# Patient Record
Sex: FEMALE | Race: WHITE | NOT HISPANIC OR LATINO | Employment: UNEMPLOYED | URBAN - METROPOLITAN AREA
[De-identification: names, ages, dates, MRNs, and addresses within clinical notes are randomized per-mention and may not be internally consistent; named-entity substitution may affect disease eponyms.]

---

## 2018-08-14 ENCOUNTER — OFFICE VISIT (OUTPATIENT)
Dept: FAMILY MEDICINE | Facility: CLINIC | Age: 28
End: 2018-08-14
Payer: OTHER GOVERNMENT

## 2018-08-14 VITALS
HEART RATE: 74 BPM | HEIGHT: 65 IN | BODY MASS INDEX: 24.74 KG/M2 | WEIGHT: 148.5 LBS | SYSTOLIC BLOOD PRESSURE: 124 MMHG | OXYGEN SATURATION: 99 % | DIASTOLIC BLOOD PRESSURE: 80 MMHG

## 2018-08-14 DIAGNOSIS — Z13.1 DIABETES MELLITUS SCREENING: ICD-10-CM

## 2018-08-14 DIAGNOSIS — Z01.419 ENCOUNTER FOR WELL WOMAN EXAM WITH ROUTINE GYNECOLOGICAL EXAM: ICD-10-CM

## 2018-08-14 DIAGNOSIS — Z00.00 ANNUAL PHYSICAL EXAM: Primary | ICD-10-CM

## 2018-08-14 DIAGNOSIS — Z13.220 LIPID SCREENING: ICD-10-CM

## 2018-08-14 PROCEDURE — 99395 PREV VISIT EST AGE 18-39: CPT | Mod: ,,, | Performed by: NURSE PRACTITIONER

## 2018-08-14 NOTE — PATIENT INSTRUCTIONS
Prevention Guidelines, Women Ages 18 to 39  Screening tests and vaccines are an important part of managing your health. Health counseling is essential, too. Below are guidelines for these, for women ages 18 to 39. Talk with your healthcare provider to make sure youre up-to-date on what you need.  Screening Who needs it How often   Alcohol misuse All women in this age group At routine exams   Blood pressure All women in this age group Every 2 years if your blood pressure is less than 120/80 mm Hg; yearly if your systolic blood pressure is 120 to 139 mm Hg, or your diastolic blood pressure reading is 80 to 89 mm Hg   Breast cancer All women in this age group should talk with their healthcare providers about the need for clinical breast exams (CBE)1 Clinical breast exam every 3 years1   Cervical cancer Women ages 21 and older Women between ages 21 and 29 should have a Pap test every 3 years; women between ages 30 and 65 are advised to have a Pap test plus an HPV test every 5 years   Chlamydia Sexually active women ages 24 and younger, and women at increased risk for infection Every 3 years if you're at risk or have symptoms   Depression All women in this age group At routine exams   Diabetes mellitus, type 2 Adults with no symptoms who are overweight or obese and have 1 or more other risk factors for diabetes At least every 3 years   Gonorrhea Sexually active women at increased risk for infection At routine exams   Hepatitis C Anyone at increased risk At routine exams   HIV All women At routine exams   Obesity All women in this age group At routine exams   Syphilis Women at increased risk for infection - talk with your healthcare provider At routine exams   Tuberculosis Women at increased risk for infection - talk with your healthcare provider Ask your healthcare provider   Vision All women in this age group At least 1 complete exam in your 20s, and 2 in your 30s   Vaccine2 Who needs it How often   Chickenpox  (varicella) All women in this age group who have no record of this infection or vaccine 2 doses; the second dose should be given 4 to 8 weeks after the first dose   Hepatitis A Women at increased risk for infection - talk with your healthcare provider 2 doses given at least 6 months apart   Hepatitis B Women at increased risk for infection - talk with your healthcare provider 3 doses over 6 months; second dose should be given 1 month after the first dose; the third dose should be given at least 2 months after the second dose and at least 4 months after the first dose   Haemophilus influenzae Type B (HIB) Women at increased risk for infection - talk with your healthcare provider 1 to 3 doses   Human papillomavirus (HPV) All women in this age group up to age 26 3 doses; the second dose should be given 1 to 2 months after the first dose and the third dose given 6 months after the first dose   Influenza (flu) All women in this age group Once a year   Measles, mumps, rubella (MMR) All women in this age group who have no record of these infections or vaccines 1 or 2 doses   Meningococcal Women at increased risk for infection - talk with your healthcare provider 1 or more doses   Pneumococcal conjugate vaccine (PCV13) and pneumococcal polysaccharide vaccine (PPSV23) Women at increased risk for infection - talk with your healthcare provider PCV13: 1 dose ages 19 to 65 (protects against 13 types of pneumococcal bacteria)  PPSV23: 1 to 2 doses through age 64, or 1 dose at 65 or older (protects against 23 types of pneumococcal bacteria)      Tetanus/diphtheria/pertussis (Td/Tdap) booster All women in this age group Td every 10 years, or a one-time dose of Tdap instead of a Td booster after age 18, then Td every 10 years   Counseling Who needs it How often   BRCA gene mutation testing for breast and ovarian cancer susceptibility Women with increased risk for having gene mutation When your risk is known   Breast cancer and  chemoprevention Women at high risk for breast cancer When your risk is known   Diet and exercise Women who are overweight or obese When diagnosed, and then at routine exams   Domestic violence Women at the age in which they are able to have children At routine exams   Sexually transmitted infection prevention Women who are sexually active At routine exams   Skin cancer Prevention of skin cancer in fair-skinned adults through age 24 At routine exams   Use of tobacco and the health effects it can cause All women in this age group Every visit   1According to the ACS, women ages 20 to 39 years should have a clinical breast exam (CBE) as part of their routine health exam every 3 years. Breast self-exams are an option for women starting in their 20s. But the  USPSTF does not recommend CBE.  2Those who are 18 years old and not up-to-date on their childhood vaccines should get all appropriate catch-up vaccines recommended by the CDC.  Date Last Reviewed: 8/27/2015  © 4797-1723 [employer]. [address] [place] [address], [place] [address]. All rights reserved. This information is not intended as a substitute for professional medical care. Always follow your healthcare professional's instructions.

## 2018-08-14 NOTE — PROGRESS NOTES
HPI: oHlly Langston  is a 28 y.o. female who presents for annual physical .  No complaints at this time.     Review of Systems   Constitutional: Negative for activity change, appetite change and fever.   HENT: Negative for congestion, ear discharge, ear pain, sore throat, trouble swallowing and voice change.    Eyes: Negative for photophobia, pain, discharge and visual disturbance.   Respiratory: Negative for cough, chest tightness and shortness of breath.    Cardiovascular: Negative for chest pain and palpitations.   Gastrointestinal: Negative for abdominal pain, nausea and vomiting.   Endocrine: Negative for cold intolerance and heat intolerance.   Genitourinary: Negative for difficulty urinating and dysuria.   Musculoskeletal: Negative for arthralgias and gait problem.   Skin: Negative for rash.   Allergic/Immunologic: Negative for immunocompromised state.   Neurological: Negative for speech difficulty and headaches.   Psychiatric/Behavioral: Negative for confusion, self-injury and suicidal ideas.     Review of patient's allergies indicates:   Allergen Reactions    Sulfa (sulfonamide antibiotics)      History reviewed. No pertinent past medical history.  Past Surgical History:   Procedure Laterality Date    FRACTURE SURGERY       Family History   Problem Relation Age of Onset    Arthritis Father      Social History     Tobacco Use    Smoking status: Never Smoker    Smokeless tobacco: Never Used   Substance Use Topics    Alcohol use: Yes     Comment: drinks wine 3-4x weekly    Drug use: No      Health Maintenance Topics with due status: Not Due       Topic Last Completion Date    Pap Smear 03/02/2017    TETANUS VACCINE 09/01/2017     Immunization History   Administered Date(s) Administered    Influenza 09/01/2017    Tdap 09/01/2017     OBJECTIVE:      Vitals:    08/14/18 1420   BP: 124/80   Pulse: 74     Physical Exam   Constitutional: She is oriented to person, place, and time. She appears well-developed  and well-nourished. She is cooperative. No distress.   HENT:   Head: Normocephalic and atraumatic.   Right Ear: Tympanic membrane and external ear normal.   Left Ear: Tympanic membrane and external ear normal.   Nose: Nose normal.   Mouth/Throat: Oropharynx is clear and moist.   Eyes: Conjunctivae, EOM and lids are normal. Pupils are equal, round, and reactive to light. Lids are everted and swept, no foreign bodies found. Right pupil is round and reactive. Left pupil is round and reactive.   Neck: Trachea normal and normal range of motion. Neck supple.   Cardiovascular: Normal rate, regular rhythm, S1 normal, S2 normal, normal heart sounds and intact distal pulses.   No murmur heard.  Pulmonary/Chest: Effort normal and breath sounds normal. No respiratory distress. She has no wheezes.   Abdominal: Soft. Bowel sounds are normal. There is no rigidity and no guarding.   Musculoskeletal: Normal range of motion.   Lymphadenopathy:     She has no cervical adenopathy.     She has no axillary adenopathy.   Neurological: She is alert and oriented to person, place, and time.   Skin: Skin is warm and dry. Capillary refill takes less than 2 seconds.   Psychiatric: She has a normal mood and affect. Her behavior is normal. Judgment and thought content normal.   Nursing note and vitals reviewed.     Assessment:       1. Annual physical exam    2. Diabetes mellitus screening    3. Lipid screening    4. Encounter for well woman exam with routine gynecological exam        Plan:       Annual physical exam  -     Urinalysis; Future; Expected date: 08/14/2018    Diabetes mellitus screening  -     Basic metabolic panel; Future; Expected date: 08/14/2018    Lipid screening  -     Lipid panel; Future; Expected date: 08/14/2018    Encounter for well woman exam with routine gynecological exam  -     Ambulatory referral to Obstetrics / Gynecology        Patient counseled on age appropriate medical preventative services, age appropriate cancer  screenings, nutrition, healthy diet, consistent exercise regimen and maintaining an active lifestyle.      Counseled on age appropriate vaccines and discussed upcoming health care needs based on age/gender.  Spent time with patient counseling on need for a good patient/doctor relationship moving forward.  Discussed use of common OTC medications and supplements.  Discussed common dietary aids and use of caffeine and the need for good sleep hygiene and stress management.    Follow-up in about 1 year (around 8/14/2019) for annual physical.      8/14/2018 ANTONIA Palmer, FNP-C

## 2018-09-06 LAB
BUN SERPL-MCNC: 12 MG/DL (ref 8–20)
CALCIUM SERPL-MCNC: 9.5 MG/DL (ref 7.7–10.4)
CHLORIDE: 104 MMOL/L (ref 98–110)
CO2 SERPL-SCNC: 27.1 MMOL/L (ref 22.8–31.6)
CREATININE: 0.78 MG/DL (ref 0.6–1.4)
GLUCOSE: 87 MG/DL (ref 70–99)
POTASSIUM SERPL-SCNC: 3.9 MMOL/L (ref 3.5–5)
SODIUM: 139 MMOL/L (ref 134–144)

## 2018-09-07 ENCOUNTER — PATIENT MESSAGE (OUTPATIENT)
Dept: FAMILY MEDICINE | Facility: CLINIC | Age: 28
End: 2018-09-07

## 2018-09-14 ENCOUNTER — CLINICAL SUPPORT (OUTPATIENT)
Dept: PEDIATRICS | Facility: CLINIC | Age: 28
End: 2018-09-14
Payer: OTHER GOVERNMENT

## 2018-09-14 DIAGNOSIS — Z23 NEEDS FLU SHOT: Primary | ICD-10-CM

## 2018-09-14 PROCEDURE — 90686 IIV4 VACC NO PRSV 0.5 ML IM: CPT | Mod: ,,, | Performed by: INTERNAL MEDICINE

## 2018-09-14 PROCEDURE — 90471 IMMUNIZATION ADMIN: CPT | Mod: ,,, | Performed by: INTERNAL MEDICINE

## 2018-09-17 ENCOUNTER — PATIENT MESSAGE (OUTPATIENT)
Dept: FAMILY MEDICINE | Facility: CLINIC | Age: 28
End: 2018-09-17

## 2018-10-08 ENCOUNTER — TELEPHONE (OUTPATIENT)
Dept: FAMILY MEDICINE | Facility: CLINIC | Age: 28
End: 2018-10-08

## 2018-10-08 NOTE — TELEPHONE ENCOUNTER
----- Message from HERVE Silva sent at 10/5/2018 10:19 AM CDT -----  Labs look great.  All normal.

## 2023-07-25 ENCOUNTER — TELEPHONE (OUTPATIENT)
Dept: FAMILY MEDICINE | Facility: CLINIC | Age: 33
End: 2023-07-25

## 2023-07-25 ENCOUNTER — OFFICE VISIT (OUTPATIENT)
Dept: FAMILY MEDICINE | Facility: CLINIC | Age: 33
End: 2023-07-25
Payer: OTHER GOVERNMENT

## 2023-07-25 DIAGNOSIS — J30.1 SEASONAL ALLERGIC RHINITIS DUE TO POLLEN: Primary | ICD-10-CM

## 2023-07-25 DIAGNOSIS — Z12.83 SKIN CANCER SCREENING: ICD-10-CM

## 2023-07-25 DIAGNOSIS — D22.9 NEVUS: ICD-10-CM

## 2023-07-25 PROCEDURE — 99204 OFFICE O/P NEW MOD 45 MIN: CPT | Performed by: NURSE PRACTITIONER

## 2023-07-25 PROCEDURE — 99203 PR OFFICE/OUTPT VISIT, NEW, LEVL III, 30-44 MIN: ICD-10-PCS | Mod: S$PBB,,, | Performed by: NURSE PRACTITIONER

## 2023-07-25 PROCEDURE — 99203 OFFICE O/P NEW LOW 30 MIN: CPT | Mod: S$PBB,,, | Performed by: NURSE PRACTITIONER

## 2023-07-25 RX ORDER — MULTIVIT WITH MINERALS/HERBS
1 TABLET ORAL DAILY
COMMUNITY

## 2023-07-25 RX ORDER — CHOLECALCIFEROL (VITAMIN D3) 25 MCG
1000 TABLET ORAL DAILY
COMMUNITY

## 2023-07-25 RX ORDER — AZELASTINE 1 MG/ML
1 SPRAY, METERED NASAL 2 TIMES DAILY
Qty: 30 ML | Refills: 5 | Status: SHIPPED | OUTPATIENT
Start: 2023-07-25 | End: 2024-02-06 | Stop reason: SDUPTHER

## 2023-07-25 RX ORDER — FLUTICASONE PROPIONATE 50 MCG
1 SPRAY, SUSPENSION (ML) NASAL 2 TIMES DAILY
Qty: 16 G | Refills: 5 | Status: SHIPPED | OUTPATIENT
Start: 2023-07-25 | End: 2024-02-06 | Stop reason: SDUPTHER

## 2023-07-25 RX ORDER — MULTIVITAMIN
1 TABLET ORAL DAILY
COMMUNITY

## 2023-07-25 NOTE — PROGRESS NOTES
Subjective:       Patient ID: Holly Langston is a 33 y.o. female.    Chief Complaint: Sinus Problem (X 2-3 days/Patient in 2018 wants to establish care )    Patient presents today as a new patient to me here to establish care and obtain evaluation for sinus problems and recurrent allergies. She also has multiple nevi that she is wanting to get checked by dermatology. She is requesting a dermatology referral for skin evaluation.  Patient is     Sinus Problem  This is a new problem. The current episode started more than 1 month ago. The problem has been waxing and waning since onset. There has been no fever. The pain is mild. Associated symptoms include congestion and sneezing. Pertinent negatives include no coughing, ear pain, headaches, neck pain, shortness of breath, sore throat or swollen glands. Past treatments include nothing. The treatment provided no relief.   Review of Systems   Constitutional:  Positive for activity change. Negative for appetite change, fever and unexpected weight change.   HENT:  Positive for congestion and sneezing. Negative for ear discharge, ear pain, hearing loss, rhinorrhea, sore throat, trouble swallowing and voice change.    Eyes:  Negative for photophobia, pain, discharge and visual disturbance.   Respiratory:  Negative for cough, chest tightness, shortness of breath and wheezing.    Cardiovascular:  Negative for chest pain and palpitations.   Gastrointestinal:  Negative for abdominal pain, blood in stool, constipation, diarrhea, nausea and vomiting.   Endocrine: Negative for cold intolerance, heat intolerance, polydipsia and polyuria.   Genitourinary:  Negative for difficulty urinating, dysuria, hematuria and menstrual problem.   Musculoskeletal:  Negative for arthralgias, gait problem, joint swelling and neck pain.   Skin:  Negative for rash.        Multiple nevi   Allergic/Immunologic: Negative for immunocompromised state.   Neurological:  Negative for speech difficulty, weakness  "and headaches.   Psychiatric/Behavioral:  Negative for confusion, dysphoric mood, self-injury and suicidal ideas.    History reviewed. No pertinent past medical history.   Past Surgical History:   Procedure Laterality Date    FRACTURE SURGERY         Family History   Problem Relation Age of Onset    Arthritis Father        Social History     Socioeconomic History    Marital status:    Tobacco Use    Smoking status: Never    Smokeless tobacco: Never   Substance and Sexual Activity    Alcohol use: Yes     Comment: drinks wine 3-4x weekly    Drug use: No    Sexual activity: Yes     Birth control/protection: None       Current Outpatient Medications   Medication Sig Dispense Refill    b complex vitamins tablet Take 1 tablet by mouth once daily.      chromium/royal jelly/pollen xt (SERENOL ORAL) Take by mouth.      multivitamin (ONE DAILY MULTIVITAMIN) per tablet Take 1 tablet by mouth once daily.      vitamin D (VITAMIN D3) 1000 units Tab Take 1,000 Units by mouth once daily.      azelastine (ASTELIN) 137 mcg (0.1 %) nasal spray 1 spray (137 mcg total) by Nasal route 2 (two) times daily. 30 mL 5    fluticasone propionate (FLONASE) 50 mcg/actuation nasal spray 1 spray (50 mcg total) by Each Nostril route 2 (two) times daily. 16 g 5    prenatal vit calc,iron,folic (PRENATAL VITAMIN ORAL) Take 1 tablet by mouth once daily.       No current facility-administered medications for this visit.       Review of patient's allergies indicates:   Allergen Reactions    Sulfa (sulfonamide antibiotics)      Objective:    HPI     Sinus Problem     Additional comments: X 2-3 days  Patient in 2018 wants to establish care           Last edited by Sofía Díaz MA on 7/25/2023  8:46 AM.      Pulse (P) 64, height (P) 5' 6" (1.676 m), weight (P) 66.2 kg (145 lb 14.4 oz), SpO2 (P) 98 %. Body mass index is 23.55 kg/m² (pended).   Physical Exam  Vitals and nursing note reviewed.   Constitutional:       General: She is not in acute " distress.     Appearance: Normal appearance. She is well-developed.   HENT:      Head: Normocephalic and atraumatic.      Right Ear: External ear normal.      Left Ear: External ear normal.      Nose: Nose normal.      Mouth/Throat:      Mouth: Mucous membranes are moist.   Eyes:      General: Lids are normal. Lids are everted, no foreign bodies appreciated.      Conjunctiva/sclera: Conjunctivae normal.      Pupils: Pupils are equal, round, and reactive to light.      Right eye: Pupil is round and reactive.      Left eye: Pupil is round and reactive.   Neck:      Trachea: Trachea normal.   Cardiovascular:      Rate and Rhythm: Normal rate and regular rhythm.      Pulses: Normal pulses.      Heart sounds: Normal heart sounds, S1 normal and S2 normal.   Pulmonary:      Effort: Pulmonary effort is normal.      Breath sounds: Normal breath sounds.   Abdominal:      General: Abdomen is flat. Bowel sounds are normal.      Palpations: Abdomen is soft. Abdomen is not rigid.      Tenderness: There is no guarding.   Musculoskeletal:         General: Normal range of motion.      Cervical back: Normal range of motion and neck supple. No muscular tenderness.   Lymphadenopathy:      Cervical: No cervical adenopathy.   Skin:     General: Skin is warm and dry.      Capillary Refill: Capillary refill takes less than 2 seconds.   Neurological:      General: No focal deficit present.      Mental Status: She is alert and oriented to person, place, and time.   Psychiatric:         Mood and Affect: Mood normal.         Behavior: Behavior normal. Behavior is cooperative.         Thought Content: Thought content normal.         Judgment: Judgment normal.           Assessment:       1. Seasonal allergic rhinitis due to pollen    2. Skin cancer screening    3. Nevus    4. BMI 23.0-23.9, adult        Plan:       Holly was seen today for sinus problem.    Diagnoses and all orders for this visit:    Seasonal allergic rhinitis due to pollen  -      fluticasone propionate (FLONASE) 50 mcg/actuation nasal spray; 1 spray (50 mcg total) by Each Nostril route 2 (two) times daily.  -     azelastine (ASTELIN) 137 mcg (0.1 %) nasal spray; 1 spray (137 mcg total) by Nasal route 2 (two) times daily.    Skin cancer screening  -     Ambulatory referral/consult to Dermatology; Future    Nevus  -     Ambulatory referral/consult to Dermatology; Future    BMI 23.0-23.9, adult  Healthy diet and exercise advised.    Follow up in 6 months for annual wellness.

## 2023-07-25 NOTE — TELEPHONE ENCOUNTER
Spoke to pt and asked her to reach out to [facility] to make sure Julissa is listed as her PCP. Pt stated she would call  and call us back once it is done.

## 2023-07-25 NOTE — TELEPHONE ENCOUNTER
Pt called back and stated they have changed her pcp to Julissa and they told her it could take at least 3 business days for it to reflect.

## 2023-07-25 NOTE — TELEPHONE ENCOUNTER
Pt called back and stated [facility] has changed her pcp to Julissa and they told her it could take at least 3 business days for it to reflect.

## 2023-07-27 ENCOUNTER — HOSPITAL ENCOUNTER (EMERGENCY)
Facility: HOSPITAL | Age: 33
Discharge: HOME | End: 2023-07-27
Attending: EMERGENCY MEDICINE
Payer: OTHER GOVERNMENT

## 2023-07-27 VITALS
WEIGHT: 145 LBS | RESPIRATION RATE: 16 BRPM | BODY MASS INDEX: 23.3 KG/M2 | DIASTOLIC BLOOD PRESSURE: 90 MMHG | OXYGEN SATURATION: 100 % | HEIGHT: 66 IN | HEART RATE: 90 BPM | TEMPERATURE: 98 F | SYSTOLIC BLOOD PRESSURE: 128 MMHG

## 2023-07-27 DIAGNOSIS — N93.8 DYSFUNCTIONAL UTERINE BLEEDING: Primary | ICD-10-CM

## 2023-07-27 DIAGNOSIS — Z97.5 IUD (INTRAUTERINE DEVICE) IN PLACE: ICD-10-CM

## 2023-07-27 DIAGNOSIS — N93.9 VAGINAL BLEEDING: ICD-10-CM

## 2023-07-27 LAB
ALBUMIN SERPL BCP-MCNC: 4.6 G/DL (ref 3.5–5.2)
ALP SERPL-CCNC: 33 U/L (ref 55–135)
ALT SERPL W/O P-5'-P-CCNC: 22 U/L (ref 10–44)
ANION GAP SERPL CALC-SCNC: 7 MMOL/L (ref 8–16)
AST SERPL-CCNC: 21 U/L (ref 10–40)
B-HCG UR QL: NEGATIVE
BACTERIA GENITAL QL WET PREP: ABNORMAL
BASOPHILS # BLD AUTO: 0.01 K/UL (ref 0–0.2)
BASOPHILS NFR BLD: 0.2 % (ref 0–1.9)
BILIRUB SERPL-MCNC: 0.9 MG/DL (ref 0.1–1)
BUN SERPL-MCNC: 15 MG/DL (ref 6–20)
CALCIUM SERPL-MCNC: 9.6 MG/DL (ref 8.7–10.5)
CHLORIDE SERPL-SCNC: 105 MMOL/L (ref 95–110)
CLUE CELLS VAG QL WET PREP: ABNORMAL
CO2 SERPL-SCNC: 25 MMOL/L (ref 23–29)
CREAT SERPL-MCNC: 0.8 MG/DL (ref 0.5–1.4)
CTP QC/QA: YES
DIFFERENTIAL METHOD: NORMAL
EOSINOPHIL # BLD AUTO: 0.1 K/UL (ref 0–0.5)
EOSINOPHIL NFR BLD: 2.4 % (ref 0–8)
ERYTHROCYTE [DISTWIDTH] IN BLOOD BY AUTOMATED COUNT: 12.7 % (ref 11.5–14.5)
EST. GFR  (NO RACE VARIABLE): >60 ML/MIN/1.73 M^2
FILAMENT FUNGI VAG WET PREP-#/AREA: ABNORMAL
GLUCOSE SERPL-MCNC: 97 MG/DL (ref 70–110)
HCT VFR BLD AUTO: 43.5 % (ref 37–48.5)
HGB BLD-MCNC: 14.7 G/DL (ref 12–16)
IMM GRANULOCYTES # BLD AUTO: 0.01 K/UL (ref 0–0.04)
IMM GRANULOCYTES NFR BLD AUTO: 0.2 % (ref 0–0.5)
LYMPHOCYTES # BLD AUTO: 1.5 K/UL (ref 1–4.8)
LYMPHOCYTES NFR BLD: 29 % (ref 18–48)
MCH RBC QN AUTO: 30.9 PG (ref 27–31)
MCHC RBC AUTO-ENTMCNC: 33.8 G/DL (ref 32–36)
MCV RBC AUTO: 91 FL (ref 82–98)
MONOCYTES # BLD AUTO: 0.4 K/UL (ref 0.3–1)
MONOCYTES NFR BLD: 7.6 % (ref 4–15)
NEUTROPHILS # BLD AUTO: 3.1 K/UL (ref 1.8–7.7)
NEUTROPHILS NFR BLD: 60.6 % (ref 38–73)
NRBC BLD-RTO: 0 /100 WBC
PLATELET # BLD AUTO: 183 K/UL (ref 150–450)
PMV BLD AUTO: 9.6 FL (ref 9.2–12.9)
POTASSIUM SERPL-SCNC: 4.4 MMOL/L (ref 3.5–5.1)
PROT SERPL-MCNC: 7.6 G/DL (ref 6–8.4)
RBC # BLD AUTO: 4.76 M/UL (ref 4–5.4)
SODIUM SERPL-SCNC: 137 MMOL/L (ref 136–145)
SPECIMEN SOURCE: ABNORMAL
T VAGINALIS GENITAL QL WET PREP: ABNORMAL
WBC # BLD AUTO: 5.03 K/UL (ref 3.9–12.7)
WBC #/AREA VAG WET PREP: ABNORMAL
YEAST GENITAL QL WET PREP: ABNORMAL

## 2023-07-27 PROCEDURE — 80053 COMPREHEN METABOLIC PANEL: CPT | Performed by: NURSE PRACTITIONER

## 2023-07-27 PROCEDURE — 87591 N.GONORRHOEAE DNA AMP PROB: CPT | Performed by: NURSE PRACTITIONER

## 2023-07-27 PROCEDURE — 81025 URINE PREGNANCY TEST: CPT | Performed by: NURSE PRACTITIONER

## 2023-07-27 PROCEDURE — 85025 COMPLETE CBC W/AUTO DIFF WBC: CPT | Performed by: NURSE PRACTITIONER

## 2023-07-27 PROCEDURE — 99284 EMERGENCY DEPT VISIT MOD MDM: CPT | Mod: 25

## 2023-07-27 PROCEDURE — 87210 SMEAR WET MOUNT SALINE/INK: CPT | Performed by: NURSE PRACTITIONER

## 2023-07-27 NOTE — FIRST PROVIDER EVALUATION
"Medical screening examination initiated.  I have conducted a focused provider triage encounter, findings are as follows:    Brief history of present illness:  Irregular vaginal bleeding x a few days with mild cramping. Has an IUD and concerned on why she is having abnormal bleeding. Not soaking pads or having lightheadedness, syncope, palpitations.     Vitals:    07/27/23 1032   BP: (!) 128/90   BP Location: Left arm   Patient Position: Sitting   Pulse: 90   Resp: 16   Temp: 98 °F (36.7 °C)   TempSrc: Oral   SpO2: 100%   Weight: 65.8 kg (145 lb)   Height: 5' 6" (1.676 m)       Pertinent physical exam:  No distress    Brief workup plan:  Basic labs, UPT    Preliminary workup initiated; this workup will be continued and followed by the physician or advanced practice provider that is assigned to the patient when roomed.  "

## 2023-07-27 NOTE — ED PROVIDER NOTES
Encounter Date: 7/27/2023       History     Chief Complaint   Patient presents with    Vaginal Bleeding     X 1 DAY    Abdominal Cramping     Holly Langston is a 33 year old female with no pmh presenting to the ED with c/o vaginal bleeding. She reports having a copper IUD that has been in place for the past two years. She had a normal menstrual period two weeks ago and began having menstrual cramps and vaginal bleeding yesterday. She states it is as heavy as a normal period. She has had no lightheadedness/dizziness, palpitations, SOB.     Review of patient's allergies indicates:   Allergen Reactions    Sulfa (sulfonamide antibiotics)      No past medical history on file.  Past Surgical History:   Procedure Laterality Date    FRACTURE SURGERY       Family History   Problem Relation Age of Onset    Arthritis Father      Social History     Tobacco Use    Smoking status: Never    Smokeless tobacco: Never   Substance Use Topics    Alcohol use: Yes     Comment: drinks wine 3-4x weekly    Drug use: No     Review of Systems   Constitutional:  Negative for fever.   HENT:  Negative for sore throat.    Respiratory:  Negative for shortness of breath.    Cardiovascular:  Negative for chest pain.   Gastrointestinal:  Negative for nausea and vomiting.   Genitourinary:  Positive for menstrual problem and vaginal bleeding. Negative for dysuria and vaginal discharge.   Musculoskeletal:  Negative for back pain.   Skin:  Negative for rash.   Neurological:  Negative for weakness.   Hematological:  Does not bruise/bleed easily.     Physical Exam     Initial Vitals [07/27/23 1032]   BP Pulse Resp Temp SpO2   (!) 128/90 90 16 98 °F (36.7 °C) 100 %      MAP       --         Physical Exam    Nursing note and vitals reviewed.  Constitutional: She appears well-developed and well-nourished. She is not diaphoretic. No distress.   HENT:   Head: Normocephalic and atraumatic.   Mouth/Throat: Oropharynx is clear and moist.   Eyes: Conjunctivae are  normal.   Neck: Neck supple.   Cardiovascular:  Normal rate, regular rhythm, normal heart sounds and intact distal pulses.     Exam reveals no gallop and no friction rub.       No murmur heard.  Pulmonary/Chest: Breath sounds normal. No respiratory distress. She has no wheezes. She has no rhonchi. She has no rales.   Abdominal: Abdomen is soft. She exhibits no distension. There is abdominal tenderness (mild suprapubic\).   Genitourinary: Cervix exhibits no motion tenderness and no discharge. Right adnexum displays no tenderness. Left adnexum displays no tenderness.    Genitourinary Comments: Unable to visualize IUD strings  Moderate amount of dark red menstrual blood in vaginal vault     Musculoskeletal:         General: Normal range of motion.      Cervical back: Neck supple.     Neurological: She is alert and oriented to person, place, and time.   Skin: No rash noted. No erythema.   Psychiatric: Her speech is normal.       ED Course   Procedures  Labs Reviewed   VAGINAL SCREEN - Abnormal; Notable for the following components:       Result Value    WBC - Vaginal Screen Rare (*)     Bacteria - Vaginal Screen Rare (*)     All other components within normal limits   COMPREHENSIVE METABOLIC PANEL - Abnormal; Notable for the following components:    Alkaline Phosphatase 33 (*)     Anion Gap 7 (*)     All other components within normal limits   C. TRACHOMATIS/N. GONORRHOEAE BY AMP DNA   CBC W/ AUTO DIFFERENTIAL   POCT URINE PREGNANCY          Imaging Results              US Pelvis Complete Non OB (Final result)  Result time 07/27/23 14:10:53   Procedure changed from US Transvaginal Non OB     Final result by Francisco Gallegos MD (07/27/23 14:10:53)                   Narrative:    Reason: irregular bleeding IUD for 2 years, LMP 7/12/2023, 33-year-old female    COMPARISON: None    FINDINGS:  Transabdominal sonography shows uterus measuring 10.1 x 4.5 x 5.9 cm. Echogenic focus along the endometrium represents an intrauterine  device. Endometrial thickness of 6 mm is normal.    Right ovary measures 30 x 24 x 30 mm, containing a 23 mm physiologic follicle. Left ovary measures 24 x 21 x 18 mm. Bilateral ovaries contain normal vascular flow in color and spectral Doppler imaging. No other adnexal mass or free pelvic fluid.    IMPRESSION:    1. IUD along the endometrium.  2. Otherwise normal pelvic ultrasound.    Electronically signed by:  Francisco Gallegos MD  7/27/2023 2:10 PM CDT Workstation: [id number]                                     Medications - No data to display        APC / Resident Notes:   This is an urgent evaluation of a 33 year old female presenting to the ED for vaginal bleeding. She has normal vital signs while in the ED. Labs ordered and reviewed with results as follows:   07/27/23 11:09  WBC: 5.03  RBC: 4.76  Hemoglobin: 14.7  Hematocrit: 43.5  07/27/23 11:09  Sodium: 137  Potassium: 4.4  Chloride: 105  CO2: 25  Anion Gap: 7 (L)  BUN: 15  Creatinine: 0.8  eGFR: >60.0  Glucose: 97  Calcium: 9.6  Alkaline Phosphatase: 33 (L)  PROTEIN TOTAL: 7.6  Albumin: 4.6  BILIRUBIN TOTAL: 0.9  AST: 21  ALT: 22    07/27/23 11:24  Preg Test, Ur: Negative      07/27/23 13:07  WBC - Vaginal Screen: Rare !  Bacteria - Vaginal Screen: Rare !  Budding Yeast: None  Clue Cells, Wet Prep: None  Fungal Hyphae: None  Trichomonas: None    Ultrasound ordered and reviewed with results as follows:   IUD along the endometrium.  2. Otherwise normal pelvic ultrasound.    I discussed the case with Dr. Otero, GYN, who states ultrasound appears normal and patient appears to be stable for discharge. Bleeding could be due to stress from recent move. She has an appointment with Dr. Ocasio on 8-2-23 and will keep this scheduled appointment. Strict ED return precautions discussed and patient verbalized understanding. Based on my clinical evaluation, I do not appreciate any immediate, emergent, or life threatening condition or etiology that warrants additional workup  today and feel that the patient can be discharged with close follow up care.                      Clinical Impression:   Final diagnoses:  [N93.9] Vaginal bleeding  [N93.8] Dysfunctional uterine bleeding (Primary)  [Z97.5] IUD (intrauterine device) in place        ED Disposition Condition    Discharge Stable          ED Prescriptions    None       Follow-up Information       Follow up With Specialties Details Why Contact Info Additional Information    [facility] - Emergency Dept Emergency Medicine  As needed, If symptoms worsen [address] [facility] [id number]  [phone] 1st floor    Shashank Ocasio MD Obstetrics and Gynecology   [address]  [facility] [address]  [phone]                Pamela Shultz NP  07/27/23 1710       Randall Phillips MD  07/27/23 2016

## 2023-07-28 VITALS — SYSTOLIC BLOOD PRESSURE: 122 MMHG | DIASTOLIC BLOOD PRESSURE: 84 MMHG

## 2023-07-29 LAB
CHLAMYDIA, AMPLIFIED DNA: NEGATIVE
N GONORRHOEAE, AMPLIFIED DNA: NEGATIVE

## 2023-07-31 ENCOUNTER — TELEPHONE (OUTPATIENT)
Dept: OBSTETRICS AND GYNECOLOGY | Facility: CLINIC | Age: 33
End: 2023-07-31
Payer: OTHER GOVERNMENT

## 2023-07-31 NOTE — PROGRESS NOTES
HISTORY OF THE PRESENT ILLNESS    Holly is a 33 y.o., using Copper IUD for contraception, here for evaluation of abnormal bleeding.  Her LMP was normal on 12 JUL, ended as it usually does, and then she started bleeding again on 26 JUL.  Went to ER 27 JUL.  US showed IUD is where it belongs (strings not seen on exam).  Hgb/Hct were normal.    Today is day 6 of the bleeding.  It's been intermittently heavy.  Today is the first day that it's let up.  Was having a lot of sex in the week before the bleeding.  Her 's job was on a ship where he'd be gone for a couple months at a time.  [place] last [place].  The move is for his work and for them to be together more as a family.    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  Included PAP, IUD check, etc.  Normal for her: 5d monthly menses, usually 2 days heavy, can tell when she's ovulating, very tuned in to her body.  She can't feel IUD strings herself but has never checked much.  -------------------------------------------------------------------------------------------------------------    SOCIAL HISTORY  Lives with:  and two daughters  Domestic Violence: no  Occupation: homemaker  Education Level: Undergraduate Degree, has teaching license  Smoker: non-smoker  Alcohol: yes (weekends)  Drugs: denies  Relationship:     PAST MEDICAL HISTORY  none    PAST SURGICAL HISTORY  none    MEDICATIONS  Dietary supplements  Flonase  Claritin  MVI + D  Probiotic  B12 / folate  Serenol - started 14 JUN for PMS symptoms    ALLERGIES  Sulfa (patient does not have an allergy, but her mother and MGM do)    OBSTETRIC HISTORY  Number of vaginal deliveries: 2, had GHTN with the second  Ages of children: 5 and 2, both girls    GYNECOLOGIC HISTORY  PAP'S: no h/o abnormals, last one in JUN  STI'S: no past history  GENITAL HSV: no  DYSMENORRHEA: No  DYSPAREUNIA: No    FAMILY HISTORY  BLEEDING DISORDERS: none  CLOTTING DISORDERS:  "none  BREAST/UTERINE/OVARIAN CANCER: none    --------------------------------------------------------------------------------------------------------------    PHYSICAL EXAM  Vitals:    23 1457   BP: 124/76   Resp: 17       GEN = alert/oriented, nad, anxious but pleasant  HEENT = sclera anicteric, EOM grossly normal   = nefg, no lesions, vaginal mucosa normal without lesions, normal discharge - small dark blood noted, CVX normal without lesions, IUD strings readily seen    CT/NG (2023) = neg  WET PREP (2023) = neg    Lab Date: 2023   WBC 5   HGB 15   HCT 44        PELVIC US (2023) = uterus [id number], EMS 6mm, ROV 2h5n4td, LOV 6f7v1cg, IUD "along the endometrium"      ASSESSMENT AND PLAN:  32yo  with Paragard IUD and isolated episode of unscheduled bleeding.  Discussed a few possible explanations to include stress of recent move and life changes, increased frequency of sex may have mechanically caused some bleeding, new dietary supplement started .  Reassured patient re normal exam today, normal ultrasound last week, and normal labs done in ER.    -monitor next few cycles  -RTC if bleeding pattern doesn't return to normal    WEEKS, MD    "

## 2023-07-31 NOTE — TELEPHONE ENCOUNTER
----- Message from Genny Herrera sent at 7/31/2023  7:11 AM CDT -----  Regarding: Questions and concerns  Contact: [phone]  Patient Holly is calling. Patient was seen in the ER Thursday and is still bleeding and would like to know if she can be seen today. Please call patient at [phone]

## 2023-08-01 ENCOUNTER — OFFICE VISIT (OUTPATIENT)
Dept: OBSTETRICS AND GYNECOLOGY | Facility: CLINIC | Age: 33
End: 2023-08-01
Payer: OTHER GOVERNMENT

## 2023-08-01 VITALS
BODY MASS INDEX: 23.2 KG/M2 | HEIGHT: 66 IN | RESPIRATION RATE: 17 BRPM | DIASTOLIC BLOOD PRESSURE: 76 MMHG | SYSTOLIC BLOOD PRESSURE: 124 MMHG | WEIGHT: 144.38 LBS

## 2023-08-01 DIAGNOSIS — N92.1 IRREGULAR INTERMENSTRUAL BLEEDING: Primary | ICD-10-CM

## 2023-08-01 PROCEDURE — 99213 OFFICE O/P EST LOW 20 MIN: CPT | Mod: PBBFAC,PO | Performed by: GENERAL PRACTICE

## 2023-08-01 PROCEDURE — 99204 PR OFFICE/OUTPT VISIT, NEW, LEVL IV, 45-59 MIN: ICD-10-PCS | Mod: S$PBB,,, | Performed by: GENERAL PRACTICE

## 2023-08-01 PROCEDURE — 99204 OFFICE O/P NEW MOD 45 MIN: CPT | Mod: S$PBB,,, | Performed by: GENERAL PRACTICE

## 2023-08-01 PROCEDURE — 99999 PR PBB SHADOW E&M-EST. PATIENT-LVL III: CPT | Mod: PBBFAC,,, | Performed by: GENERAL PRACTICE

## 2023-08-01 PROCEDURE — 99999 PR PBB SHADOW E&M-EST. PATIENT-LVL III: ICD-10-PCS | Mod: PBBFAC,,, | Performed by: GENERAL PRACTICE

## 2023-09-21 ENCOUNTER — TELEPHONE (OUTPATIENT)
Dept: FAMILY MEDICINE | Facility: CLINIC | Age: 33
End: 2023-09-21
Payer: OTHER GOVERNMENT

## 2023-09-21 NOTE — TELEPHONE ENCOUNTER
Derm referral: spoke with patient. [facility] too long; travel not option. States she'll call insurance and contact in network providers.

## 2023-12-12 ENCOUNTER — PATIENT MESSAGE (OUTPATIENT)
Dept: FAMILY MEDICINE | Facility: CLINIC | Age: 33
End: 2023-12-12

## 2024-01-24 ENCOUNTER — PATIENT MESSAGE (OUTPATIENT)
Dept: FAMILY MEDICINE | Facility: CLINIC | Age: 34
End: 2024-01-24
Payer: OTHER GOVERNMENT

## 2024-02-06 ENCOUNTER — OFFICE VISIT (OUTPATIENT)
Dept: FAMILY MEDICINE | Facility: CLINIC | Age: 34
End: 2024-02-06
Payer: OTHER GOVERNMENT

## 2024-02-06 VITALS
HEART RATE: 62 BPM | TEMPERATURE: 99 F | BODY MASS INDEX: 23.14 KG/M2 | WEIGHT: 144 LBS | SYSTOLIC BLOOD PRESSURE: 110 MMHG | OXYGEN SATURATION: 99 % | HEIGHT: 66 IN | DIASTOLIC BLOOD PRESSURE: 64 MMHG

## 2024-02-06 DIAGNOSIS — Z00.00 ANNUAL PHYSICAL EXAM: Primary | ICD-10-CM

## 2024-02-06 DIAGNOSIS — Z11.59 NEED FOR HEPATITIS C SCREENING TEST: ICD-10-CM

## 2024-02-06 DIAGNOSIS — Z11.4 ENCOUNTER FOR SCREENING FOR HIV: ICD-10-CM

## 2024-02-06 DIAGNOSIS — J30.1 SEASONAL ALLERGIC RHINITIS DUE TO POLLEN: ICD-10-CM

## 2024-02-06 DIAGNOSIS — Z13.29 THYROID DISORDER SCREEN: ICD-10-CM

## 2024-02-06 DIAGNOSIS — Z01.00 ROUTINE EYE EXAM: ICD-10-CM

## 2024-02-06 DIAGNOSIS — Z13.220 LIPID SCREENING: ICD-10-CM

## 2024-02-06 PROCEDURE — 99395 PREV VISIT EST AGE 18-39: CPT | Mod: S$PBB,,, | Performed by: NURSE PRACTITIONER

## 2024-02-06 PROCEDURE — 99215 OFFICE O/P EST HI 40 MIN: CPT | Mod: PBBFAC,PN | Performed by: NURSE PRACTITIONER

## 2024-02-06 PROCEDURE — 99999 PR PBB SHADOW E&M-EST. PATIENT-LVL V: CPT | Mod: PBBFAC,,, | Performed by: NURSE PRACTITIONER

## 2024-02-06 RX ORDER — LACTOBACILL 46/B.ANIMAL/INULIN 10B-100 MG
CAPSULE ORAL
COMMUNITY
Start: 2021-04-01

## 2024-02-06 RX ORDER — FLUTICASONE PROPIONATE 50 MCG
1 SPRAY, SUSPENSION (ML) NASAL 2 TIMES DAILY
Qty: 16 G | Refills: 5 | Status: SHIPPED | OUTPATIENT
Start: 2024-02-06

## 2024-02-06 RX ORDER — AZELASTINE 1 MG/ML
1 SPRAY, METERED NASAL 2 TIMES DAILY
Qty: 30 ML | Refills: 5 | Status: SHIPPED | OUTPATIENT
Start: 2024-02-06 | End: 2025-02-05

## 2024-02-06 NOTE — PROGRESS NOTES
HPI: Holly Langston  is a 33 y.o. female who presents for annual physical .  No complaints at this time.     Review of Systems   Constitutional:  Negative for activity change, appetite change, fever and unexpected weight change.   HENT:  Negative for congestion, ear discharge, ear pain, hearing loss, rhinorrhea, sore throat, trouble swallowing and voice change.    Eyes:  Negative for photophobia, pain, discharge and visual disturbance.   Respiratory:  Negative for cough, chest tightness, shortness of breath and wheezing.    Cardiovascular:  Negative for chest pain and palpitations.   Gastrointestinal:  Negative for abdominal pain, blood in stool, constipation, diarrhea, nausea and vomiting.   Endocrine: Negative for cold intolerance, heat intolerance, polydipsia and polyuria.   Genitourinary:  Negative for difficulty urinating, dysuria, hematuria and menstrual problem.   Musculoskeletal:  Negative for arthralgias, gait problem, joint swelling and neck pain.   Skin:  Negative for rash.   Allergic/Immunologic: Negative for immunocompromised state.   Neurological:  Negative for speech difficulty, weakness and headaches.   Psychiatric/Behavioral:  Negative for confusion, dysphoric mood, self-injury and suicidal ideas.      Review of patient's allergies indicates:   Allergen Reactions    Sulfa (sulfonamide antibiotics)      History reviewed. No pertinent past medical history.  Past Surgical History:   Procedure Laterality Date    FRACTURE SURGERY       Family History   Problem Relation Age of Onset    Arthritis Father      Social History     Tobacco Use    Smoking status: Never    Smokeless tobacco: Never   Substance Use Topics    Alcohol use: Yes     Comment: drinks wine 3-4x weekly    Drug use: No      Health Maintenance Topics with due status: Not Due       Topic Last Completion Date    TETANUS VACCINE 09/01/2017     Immunization History   Administered Date(s) Administered    Hepatitis A, Adult 11/06/2012, 11/19/2013     Influenza 09/01/2017    Influenza - Intranasal - Trivalent 11/06/2012, 10/08/2013    Influenza - Quadrivalent - PF *Preferred* (6 months and older) 09/14/2018    MMR 11/21/2013    PPD Test 11/06/2012, 11/19/2013    Tdap 11/06/2012, 09/01/2017     OBJECTIVE:      Vitals:    02/06/24 1631   BP: 110/64   Pulse: 62   Temp: 98.5 °F (36.9 °C)     Physical Exam  Vitals and nursing note reviewed.   Constitutional:       General: She is not in acute distress.     Appearance: Normal appearance. She is well-developed. She is not ill-appearing.   HENT:      Head: Normocephalic and atraumatic.      Right Ear: Tympanic membrane, ear canal and external ear normal.      Left Ear: Tympanic membrane, ear canal and external ear normal.      Nose: Nose normal. No congestion.      Mouth/Throat:      Mouth: Mucous membranes are moist.      Pharynx: Oropharynx is clear. No oropharyngeal exudate.   Eyes:      General: Lids are normal. Lids are everted, no foreign bodies appreciated.      Conjunctiva/sclera: Conjunctivae normal.      Pupils: Pupils are equal, round, and reactive to light.      Right eye: Pupil is round and reactive.      Left eye: Pupil is round and reactive.   Neck:      Trachea: Trachea normal.   Cardiovascular:      Rate and Rhythm: Normal rate and regular rhythm.      Pulses: Normal pulses.      Heart sounds: Normal heart sounds, S1 normal and S2 normal.   Pulmonary:      Effort: Pulmonary effort is normal.      Breath sounds: Normal breath sounds.   Abdominal:      General: Abdomen is flat. Bowel sounds are normal.      Palpations: Abdomen is soft. Abdomen is not rigid.      Tenderness: There is no guarding.   Musculoskeletal:         General: Normal range of motion.      Cervical back: Normal range of motion and neck supple. No muscular tenderness.   Lymphadenopathy:      Cervical: No cervical adenopathy.   Skin:     General: Skin is warm and dry.      Capillary Refill: Capillary refill takes less than 2 seconds.    Neurological:      General: No focal deficit present.      Mental Status: She is alert and oriented to person, place, and time.   Psychiatric:         Mood and Affect: Mood normal.         Behavior: Behavior normal. Behavior is cooperative.         Thought Content: Thought content normal.         Judgment: Judgment normal.        Assessment:       1. Annual physical exam    2. Seasonal allergic rhinitis due to pollen    3. Routine eye exam    4. Need for hepatitis C screening test    5. Encounter for screening for HIV    6. Thyroid disorder screen    7. Lipid screening    8. BMI 23.0-23.9, adult        Plan:       Annual physical exam  Completed    Seasonal allergic rhinitis due to pollen  -     azelastine (ASTELIN) 137 mcg (0.1 %) nasal spray; 1 spray (137 mcg total) by Nasal route 2 (two) times daily.  Dispense: 30 mL; Refill: 5  -     fluticasone propionate (FLONASE) 50 mcg/actuation nasal spray; 1 spray (50 mcg total) by Each Nostril route 2 (two) times daily.  Dispense: 16 g; Refill: 5    Routine eye exam  -     Ambulatory referral/consult to Ophthalmology; Future; Expected date: 02/13/2024    Need for hepatitis C screening test  -     Hepatitis C Antibody; Future; Expected date: 02/06/2024    Encounter for screening for HIV  -     HIV 1/2 Ag/Ab (4th Gen); Future; Expected date: 02/06/2024    Thyroid disorder screen  -     TSH; Future; Expected date: 02/06/2024    Lipid screening  -     Lipid Panel; Future; Expected date: 02/06/2024    BMI 23.0-23.9, adult  Healthy diet and exercise encouraged.      Patient counseled on age appropriate medical preventative services, age appropriate cancer screenings, nutrition, healthy diet, consistent exercise regimen and maintaining an active lifestyle.      Counseled on age appropriate vaccines and discussed upcoming health care needs based on age/gender.  Spent time with patient counseling on need for a good patient/doctor relationship moving forward.  Discussed use of  common OTC medications and supplements.  Discussed common dietary aids and use of caffeine and the need for good sleep hygiene and stress management.    Follow up in about 6 months (around 8/6/2024).      2/6/2024 ANTONIA Palmer, FNP-C

## 2024-02-23 ENCOUNTER — PATIENT MESSAGE (OUTPATIENT)
Dept: FAMILY MEDICINE | Facility: CLINIC | Age: 34
End: 2024-02-23
Payer: OTHER GOVERNMENT

## 2024-07-17 ENCOUNTER — OFFICE VISIT (OUTPATIENT)
Dept: OBSTETRICS AND GYNECOLOGY | Facility: CLINIC | Age: 34
End: 2024-07-17
Payer: OTHER GOVERNMENT

## 2024-07-17 VITALS
DIASTOLIC BLOOD PRESSURE: 78 MMHG | WEIGHT: 147.06 LBS | SYSTOLIC BLOOD PRESSURE: 128 MMHG | HEIGHT: 66 IN | BODY MASS INDEX: 23.64 KG/M2

## 2024-07-17 DIAGNOSIS — Z01.419 WELL WOMAN EXAM: Primary | ICD-10-CM

## 2024-07-17 DIAGNOSIS — Z12.4 CERVICAL CANCER SCREENING: ICD-10-CM

## 2024-07-17 PROCEDURE — 87624 HPV HI-RISK TYP POOLED RSLT: CPT | Performed by: GENERAL PRACTICE

## 2024-07-17 PROCEDURE — 99999 PR PBB SHADOW E&M-EST. PATIENT-LVL III: CPT | Mod: PBBFAC,,, | Performed by: GENERAL PRACTICE

## 2024-07-17 PROCEDURE — 99395 PREV VISIT EST AGE 18-39: CPT | Mod: S$PBB,,, | Performed by: GENERAL PRACTICE

## 2024-07-17 PROCEDURE — 99213 OFFICE O/P EST LOW 20 MIN: CPT | Mod: PBBFAC,PO | Performed by: GENERAL PRACTICE

## 2024-07-17 PROCEDURE — 88175 CYTOPATH C/V AUTO FLUID REDO: CPT | Performed by: GENERAL PRACTICE

## 2024-07-17 RX ORDER — COPPER 313.4 MG/1
INTRAUTERINE DEVICE INTRAUTERINE
COMMUNITY

## 2024-07-17 NOTE — PROGRESS NOTES
"    SUBJECTIVE   2024  Holly Langston is a 34 y.o. here for WWE.  Hasn't felt for her IUD strings.  Since seeing me last, her periods have gone back to normal.  Attributes the one abnormal cycle to the stress of moving.  Considering re-trying the Serenol for PMS symptoms (had stopped it due to isolated abnormal period).    Verified and updated history below that was originally taken 23.    23:  Holly is a 33 y.o., using Copper IUD for contraception, here for evaluation of abnormal bleeding.  --> discussed was isolated episode and encouraged to RTC if things didn't straighten out    G'sP's:   LMP:   Relationship:  and sexually active  Contraception: Copper IUD, placed MAY 2021  PAP Hx: no h/o abnormals     OBJECTIVE   Vitals:    24 1101   BP: 128/78       GEN = alert/oriented, nad, pleasant  HEENT = sclera anicteric, EOM grossly normal  BREASTS = nontender, no masses palpated, no skin changes, no nipple discharge  CV = BP and HR as per vitals  PULM = normal respiratory effort   =      External: nefg, no lesions, 1-2cm flat nevus on left medial thigh (birthmark, stable per patient), 1mm flat black nevus at 6:00 of the introiutus near childbirth scar (demonstrated to patient with a mirror)     Vagina: normal and without lesions and urethral meatus normal     Discharge: normal and physiologic     Cervix: normal-appearing, PAP smear obtained, and IUD string visualized     Bimanual: uterus normal size and nontender, no adnexal masses or tenderness    LABS & RADS   PELVIC US (2023) =  uterus [id number], EMS 6mm, ROV 2r9b5di, LOV 2e9r8jr, IUD "along the endometrium"     ASSESSMENT / PLAN   34 y.o. for WWE.    Cervical Cancer screening: f/u results of PAP / HPV --> if both negative then next screen in 5 yrs  Encouraged to monitor nevus at her introitus / perineum; RTC for changes  Mammogram: age 40  Encouraged self breast awareness; RTC for breast concerns  RTC for periodic GYN " exam, sooner sydney HALL MD  -----------------------    8/1/23    SOCIAL HISTORY  Lives with:  and two daughters  Domestic Violence: no  Occupation: homemaker  Education Level: Undergraduate Degree, has teaching license  Smoker: non-smoker  Alcohol: yes (weekends)  Drugs: denies  Relationship:     PAST MEDICAL HISTORY  none    PAST SURGICAL HISTORY  none    MEDICATIONS  Current Outpatient Medications   Medication Instructions    azelastine (ASTELIN) 137 mcg, Nasal, 2 times daily    b complex vitamins tablet 1 tablet, Oral, Daily    chromium/royal jelly/pollen xt (SERENOL ORAL) Take by mouth.    copper (PARAGARD T 380A) 380 mm2 IUD Take by intrauterine route.    fluticasone propionate (FLONASE) 50 mcg, Each Nostril, 2 times daily    Lactobacill 46-B.animal-inulin (PROBIOTIC-10, WITH INULIN,) 10 billion cell -100 mg Cap     multivitamin (ONE DAILY MULTIVITAMIN) per tablet 1 tablet, Oral, Daily    prenatal vit calc,iron,folic (PRENATAL VITAMIN ORAL) 1 tablet, Daily    vitamin D (VITAMIN D3) 1,000 Units, Oral, Daily     ALLERGIES  Sulfa (patient does not have an allergy, but her mother and MGM do)    OBSTETRIC HISTORY  Number of vaginal deliveries: 2, had GHTN with the second  Ages of children: 5 and 2, both girls    GYNECOLOGIC HISTORY  PAP'S: no h/o abnormals, last one in PAUL  STI'S: no past history  GENITAL HSV: no  DYSMENORRHEA: No  DYSPAREUNIA: No   5d monthly menses, usually 2 days heavy, can tell when she's ovulating, very tuned in to her body    FAMILY HISTORY  BLEEDING DISORDERS: none  CLOTTING DISORDERS: none  BREAST/UTERINE/OVARIAN CANCER: none

## 2024-07-17 NOTE — PATIENT INSTRUCTIONS
Have a question or concern?  for an emergency  call 911 or go to the nearest hospital [facility] Nurse Care Advice Line  [phone]  you can speak to a nurse 24-7   for non-urgent issues, send us a  message in [email or web address] for non-urgent issues, call the clinic  [phone], Option 3   Consider calling the Nurse Care Advice Line if it's a weekend or toward the end of the work-day since "[employer]hart and phone messages may not be answered right away.     PAP SMEARS:  Screening for cervical cancer involves 1 or 2 parts based on your age and situation:  PAP smear (looking at the cells from your cervix)  HPV testing (checking whether you have the Human Papilloma Virus in your cervical cells)    These test results often come back at different times, but you won't hear from me until they BOTH are back since both pieces of information are important in deciding what to do next.    Soif you see a PAP result in [email or web address] (or an HPV result) that is abnormal, please allow another 7-10 business days before you send a message asking what to do next.  I am waiting for the other test result before I make a recommendation.    If both tests are resulted in [employer], you should hear from me within 2-3 business days.    Abnormal tests will be followed up in one of two ways:  Repeat testing of PAP and/or HPV  Colposcopy (examination and biopsy of your cervix in the office using staining solutions and magnification)    STD AND VAGINITIS TESTING:  If you are enrolled in [email or web address], I will trust that when you see a negative result, you understand that means you do not have the particular STD we were checking for.  You will not get a message from me.    If something comes back positive, one of my staff members or I will call you to let you know about the result and what the recommended treatment is.  For most STD's, it is VERY important that you do not have sex until both you and your partner(s) have completed treatment for the STD.  I cannot  prescribe medications for your partner(s).    Bacterial Vaginitis (BV) and vaginal yeast infections (Candida, for example) are not STD's.    Trichomonas is an STD.    If you are prescribed an antibiotic, it is very important that you take all of the medicine as prescribed.  Incomplete treatment can cause a relapse and/or antibiotic resistance.    BIOPSIES (endometrial, cervical, vaginal, vulvar, etc.):  Biopsy results can take anywhere from 2 to 10 days to return from the pathologist.    If the treatment plan is simple or unchanged based on the biopsy result, I'll probably send a message in [employer].    If the treatment plan is complex and/or the biopsy result is cancer, I'll call you to discuss.  In some cases, I'll have my staff schedule an appointment for you to come in to discuss things in person.    If it's been two weeks since your biopsy, and you haven't heard from us, PLEASE REACH OUT to check on things.    BLADDER INFECTIONS & URINARY TRACT INFECTIONS (UTI):  There are two ways to check for a UTI:  Urinalysis or UA (checking chemical markers and/or looking under a microscope to give us an idea of whether or not there is an infection)  Results are available same-day  Results are NOT definitive  Urine Culture (putting your urine on a petri dish to see if bacteria grow)  Results take a few days to come back  This is the definitive test    If you are prescribed an antibiotic, it is very important that you take all of the medicine as prescribed.  Incomplete treatment can cause a relapse and/or antibiotic resistance.    Drink lots of water!  Your urine should be clear and very pale yellow or even colorless.    If you are pregnant and have a UTI, you are at increased risk of developing pyelonephritis (infection of the kidneys).  It is extremely important that you complete the antibiotic treatment.  After you complete treatment, we will check your urine again to make sure the infection is cleared.    GENERAL BLOOD  "TESTS:  Many parts of a blood test can be flagged as "abnormal" by the system, but based on your age and other factors, it might be considered normal.    If your test is normal, and no follow-up is needed, you will not get a message from me.    If your test is abnormal, I typically will send you a message in [employer] with recommendations (starting a medication, repeating the test, checking other tests, etc.).  Sometimes one of my staff members or I will call you.    ULTRASOUND OR CT SCANS:  If the treatment plan is simple or unchanged based on the study result, I'll probably send a message in [employer].    If the treatment plan is complex and/or the study result is concerning, I'll call you to discuss.  In some cases, I'll have my staff schedule an appointment for you to come in to discuss things in person.   "

## 2024-07-22 ENCOUNTER — PATIENT MESSAGE (OUTPATIENT)
Dept: OBSTETRICS AND GYNECOLOGY | Facility: CLINIC | Age: 34
End: 2024-07-22
Payer: OTHER GOVERNMENT

## 2024-07-22 LAB
CLINICAL INFO: NORMAL
CYTO CVX: NORMAL
CYTOLOGIST CVX/VAG CYTO: NORMAL
CYTOLOGIST CVX/VAG CYTO: NORMAL
CYTOLOGY CMNT CVX/VAG CYTO-IMP: NORMAL
CYTOLOGY PAP THIN PREP EXPLANATION: NORMAL
DATE OF PREVIOUS PAP: NORMAL
DATE PREVIOUS BX: NORMAL
GEN CATEG CVX/VAG CYTO-IMP: NORMAL
HPV I/H RISK 4 DNA CVX QL NAA+PROBE: NOT DETECTED
LMP START DATE: NORMAL
MICROORGANISM CVX/VAG CYTO: NORMAL
PATHOLOGIST CVX/VAG CYTO: NORMAL
SERVICE CMNT-IMP: NORMAL
SPECIMEN SOURCE CVX/VAG CYTO: NORMAL
STAT OF ADQ CVX/VAG CYTO-IMP: NORMAL

## 2025-05-29 ENCOUNTER — OFFICE VISIT (OUTPATIENT)
Dept: FAMILY MEDICINE | Facility: CLINIC | Age: 35
End: 2025-05-29
Payer: OTHER GOVERNMENT

## 2025-05-29 VITALS
DIASTOLIC BLOOD PRESSURE: 62 MMHG | TEMPERATURE: 98 F | OXYGEN SATURATION: 98 % | WEIGHT: 143.5 LBS | BODY MASS INDEX: 23.06 KG/M2 | SYSTOLIC BLOOD PRESSURE: 104 MMHG | HEIGHT: 66 IN | HEART RATE: 67 BPM

## 2025-05-29 DIAGNOSIS — Z13.89 ENCOUNTER FOR SURVEILLANCE OF ABNORMAL NEVI: ICD-10-CM

## 2025-05-29 DIAGNOSIS — Z12.83 SKIN CANCER SCREENING: Primary | ICD-10-CM

## 2025-05-29 PROCEDURE — 99999 PR PBB SHADOW E&M-EST. PATIENT-LVL IV: CPT | Mod: PBBFAC,,, | Performed by: NURSE PRACTITIONER

## 2025-05-29 PROCEDURE — 99214 OFFICE O/P EST MOD 30 MIN: CPT | Mod: PBBFAC,PN | Performed by: NURSE PRACTITIONER

## 2025-05-29 PROCEDURE — 99213 OFFICE O/P EST LOW 20 MIN: CPT | Mod: S$PBB,,, | Performed by: NURSE PRACTITIONER

## 2025-05-29 NOTE — PROGRESS NOTES
Subjective:       Patient ID: Holly Langston is a 35 y.o. female.    Chief Complaint: No chief complaint on file.    History of Present Illness    CHIEF COMPLAINT:  Patient presents for a referral to a dermatologist for evaluation of irregular moles and skin check.    HPI:  Patient, a 35-year-old individual, is seeking a referral to a dermatologist for evaluation of irregular moles and a full body skin cancer screening. She has not seen a dermatologist for an extended period. A friend's observation of an irregular mole recently prompted her concern. She has numerous freckles and moles, including a darker, irregular mole on her abdomen.    She has a history of significant sun exposure, having grown up in [place]. She uses sunscreen but acknowledges a lifetime of sun exposure. At a recent [facility] visit with her children, she noticed multiple white sun spots on her skin, with one particular spot appearing unusual to her.    She expresses anxiety about the upcoming dermatologist visit due to a negative full body check in her 20s that caused discomfort, which has prevented her from seeking dermatological care since then.    She mentions a specific spot on her body that recently appeared red, causing concern. She denies any moles bleeding spontaneously.      ROS:  General: -fever, -chills, -fatigue, -weight gain, -weight loss  Eyes: -vision changes, -redness, -discharge  ENT: -ear pain, -nasal congestion, -sore throat  Cardiovascular: -chest pain, -palpitations, -lower extremity edema  Respiratory: -cough, -shortness of breath  Gastrointestinal: -abdominal pain, -nausea, -vomiting, -diarrhea, -constipation, -blood in stool  Genitourinary: -dysuria, -hematuria, -frequency  Musculoskeletal: -joint pain, -muscle pain  Skin: -rash, -lesion, +abnormal moles  Neurological: -headache, -dizziness, -numbness, -tingling  Psychiatric: -anxiety, -depression, -sleep difficulty         History reviewed. No pertinent past medical  "history.     Past Surgical History:   Procedure Laterality Date    FRACTURE SURGERY         Family History   Problem Relation Name Age of Onset    Arthritis Father         Social History     Socioeconomic History    Marital status:    Tobacco Use    Smoking status: Never    Smokeless tobacco: Never   Substance and Sexual Activity    Alcohol use: Yes     Comment: drinks wine 3-4x weekly    Drug use: No    Sexual activity: Yes     Birth control/protection: None       Current Medications[1]    Review of patient's allergies indicates:   Allergen Reactions    Sulfa (sulfonamide antibiotics) Swelling     Objective:      Blood pressure 104/62, pulse 67, temperature 98.3 °F (36.8 °C), height 5' 6" (1.676 m), weight 65.1 kg (143 lb 8.3 oz), last menstrual period 05/14/2025, SpO2 98%. Body mass index is 23.16 kg/m².   Physical Exam    General: No acute distress. Well-developed. Well-nourished.  Eyes: EOMI. Sclerae anicteric.  HENT: Normocephalic. Atraumatic. Nares patent. Moist oral mucosa.  Ears: Bilateral TMs clear. Bilateral EACs clear.  Cardiovascular: Regular rate. Regular rhythm. No murmurs. No rubs. No gallops. Normal S1, S2.  Respiratory: Normal respiratory effort. Clear to auscultation bilaterally. No rales. No rhonchi. No wheezing.  Abdomen: Soft. Non-tender. Non-distended. Normoactive bowel sounds.  Musculoskeletal: No  obvious deformity.  Extremities: No lower extremity edema.  Neurological: Alert & oriented x3. No slurred speech. Normal gait.  Psychiatric: Normal mood. Normal affect. Good insight. Good judgment.  Skin: Warm. Dry. No rash. Irregular mole on stomach. Multiple nevi noted on the body.              Assessment:       Assessment & Plan    - Evaluated concern about irregular moles and sun exposure.  - Identified irregular mole on stomach with darker coloration and irregular borders.  - Took photograph of concerning mole on stomach for documentation and potential expedited dermatology " appointment.  - Recommend full body skin cancer screening for thorough evaluation.    MELANOCYTIC NEVI:  - Noted the patient has an irregular mole on the stomach that is dark with irregular borders.  - Patient is freckly with multiple moles.  - Discussed that bleeding moles can be a concerning sign, warranting further assessment.    LEUKODERMA:  - Noted the patient reports white sunspots after sun exposure.  - Explained that these are typically scars that do not tan.    SKIN CHANGES AND SUN PROTECTION:  - Patient to continue using sunscreen for sun protection.    ANXIETY DISORDER:  - Noted the patient expresses feeling sensitive and anxious about skin condition and mole.  - Provided reassurance through thorough explanation of the referral process and expected procedures.    DERMATOLOGY REFERRAL AND PROCEDURES:  - Referred the patient to Dr. Marika Fofana, dermatologist, for full body skin cancer screening and mole evaluation.  - Educated the patient about the dermatology visit process, including full body exam with magnifying glass for closer inspection.  - Explained potential biopsy procedures (shave and punch) for suspicious lesions.    DOCUMENTATION:  - Documented the patient reports irregular mole and white sun spots.       Plan:       Diagnoses and all orders for this visit:    Skin cancer screening  -     Ambulatory referral/consult to Dermatology; Future    Encounter for surveillance of abnormal nevi  -     Ambulatory referral/consult to Dermatology; Future           This note was generated with the assistance of ambient listening technology. Verbal consent was obtained by the patient and accompanying visitor(s) for the recording of patient appointment to facilitate this note. I attest to having reviewed and edited the generated note for accuracy, though some syntax or spelling errors may persist. Please contact the author of this note for any clarification.    I spent a total of 20 minutes on the day of the  visit.  This includes face to face time and non-face to face time preparing to see the patient (eg, review of tests), obtaining and/or reviewing separately obtained history, documenting clinical information in the electronic or other health record, independently interpreting results and communicating results to the patient/family/caregiver, or care coordinator.          [1]   Current Outpatient Medications   Medication Sig Dispense Refill    azelastine (ASTELIN) 137 mcg (0.1 %) nasal spray 1 spray (137 mcg total) by Nasal route 2 (two) times daily. 30 mL 5    copper (PARAGARD T 380A) 380 mm2 IUD Take by intrauterine route.      fluticasone propionate (FLONASE) 50 mcg/actuation nasal spray 1 spray (50 mcg total) by Each Nostril route 2 (two) times daily. 16 g 5    Lactobacill 46-B.animal-inulin (PROBIOTIC-10, WITH INULIN,) 10 billion cell -100 mg Cap       multivitamin (ONE DAILY MULTIVITAMIN) per tablet Take 1 tablet by mouth once daily.      vitamin D (VITAMIN D3) 1000 units Tab Take 1,000 Units by mouth once daily.       No current facility-administered medications for this visit.

## 2025-05-30 ENCOUNTER — TELEPHONE (OUTPATIENT)
Dept: DERMATOLOGY | Facility: CLINIC | Age: 35
End: 2025-05-30
Payer: OTHER GOVERNMENT

## 2025-05-30 ENCOUNTER — PATIENT MESSAGE (OUTPATIENT)
Dept: FAMILY MEDICINE | Facility: CLINIC | Age: 35
End: 2025-05-30
Payer: OTHER GOVERNMENT

## 2025-05-30 DIAGNOSIS — Z12.83 SKIN CANCER SCREENING: Primary | ICD-10-CM

## 2025-05-30 DIAGNOSIS — Z13.89 ENCOUNTER FOR SURVEILLANCE OF ABNORMAL NEVI: ICD-10-CM

## 2025-07-07 ENCOUNTER — OFFICE VISIT (OUTPATIENT)
Dept: DERMATOLOGY | Facility: CLINIC | Age: 35
End: 2025-07-07
Payer: OTHER GOVERNMENT

## 2025-07-07 VITALS — WEIGHT: 140 LBS | BODY MASS INDEX: 22.6 KG/M2

## 2025-07-07 DIAGNOSIS — Z13.89 ENCOUNTER FOR SURVEILLANCE OF ABNORMAL NEVI: ICD-10-CM

## 2025-07-07 DIAGNOSIS — D48.5 NEOPLASM OF UNCERTAIN BEHAVIOR OF SKIN: Primary | ICD-10-CM

## 2025-07-07 DIAGNOSIS — L81.4 SOLAR LENTIGO: ICD-10-CM

## 2025-07-07 DIAGNOSIS — Z12.83 SKIN CANCER SCREENING: ICD-10-CM

## 2025-07-07 DIAGNOSIS — D22.9 MULTIPLE BENIGN NEVI: ICD-10-CM

## 2025-07-07 PROCEDURE — 99203 OFFICE O/P NEW LOW 30 MIN: CPT | Mod: 25,S$GLB,, | Performed by: DERMATOLOGY

## 2025-07-07 PROCEDURE — 88305 TISSUE EXAM BY PATHOLOGIST: CPT | Mod: TC | Performed by: DERMATOLOGY

## 2025-07-07 PROCEDURE — 11102 TANGNTL BX SKIN SINGLE LES: CPT | Mod: S$GLB,,, | Performed by: DERMATOLOGY

## 2025-07-07 NOTE — PROGRESS NOTES
Subjective:      Patient ID:  Holly Langston is a 35 y.o. female who presents for   Chief Complaint   Patient presents with    Skin Check     TBSC     New Patient    Patient here for TBSC    Patient is here today for a skin check.   Pt has a history of  moderate/intense sun exposure in the past. Grew up in [place],. Tans easily  Pt recalls several blistering sunburns in the past- no  Pt has history of tanning bed use- in teens and 20s  Pt has  had moles removed in the past- no  Pt has history of melanoma in first degree relatives-  no    Derm Hx:  No Phx NMSC  No Fhx MM     Current Outpatient Medications:   ·  copper (PARAGARD T 380A) 380 mm2 IUD, Take by intrauterine route., Disp: , Rfl:   ·  fluticasone propionate (FLONASE) 50 mcg/actuation nasal spray, 1 spray (50 mcg total) by Each Nostril route 2 (two) times daily., Disp: 16 g, Rfl: 5  ·  Lactobacill 46-B.animal-inulin (PROBIOTIC-10, WITH INULIN,) 10 billion cell -100 mg Cap, , Disp: , Rfl:   ·  multivitamin (ONE DAILY MULTIVITAMIN) per tablet, Take 1 tablet by mouth once daily., Disp: , Rfl:   ·  vitamin D (VITAMIN D3) 1000 units Tab, Take 1,000 Units by mouth once daily., Disp: , Rfl:   ·  azelastine (ASTELIN) 137 mcg (0.1 %) nasal spray, 1 spray (137 mcg total) by Nasal route 2 (two) times daily., Disp: 30 mL, Rfl: 5       Review of Systems   Constitutional:  Negative for fever, chills and fatigue.   Skin:  Positive for daily sunscreen use, activity-related sunscreen use and wears hat.       Objective:   Physical Exam   Constitutional: She appears well-developed and well-nourished. No distress.   Neurological: She is alert and oriented to person, place, and time. She is not disoriented.   Psychiatric: She has a normal mood and affect.   Skin:   Areas Examined (abnormalities noted in diagram):   Scalp / Hair Palpated and Inspected  Head / Face Inspection Performed  Neck Inspection Performed  Chest / Axilla Inspection Performed  Abdomen Inspection Performed  Back  Inspection Performed  RUE Inspected  LUE Inspection Performed  RLE Inspected  LLE Inspection Performed  Nails and Digits Inspection Performed                    Diagram Legend     Erythematous scaling macule/papule c/w actinic keratosis       Vascular papule c/w angioma      Pigmented verrucoid papule/plaque c/w seborrheic keratosis      Yellow umbilicated papule c/w sebaceous hyperplasia      Irregularly shaped tan macule c/w lentigo     1-2 mm smooth white papules consistent with Milia      Movable subcutaneous cyst with punctum c/w epidermal inclusion cyst      Subcutaneous movable cyst c/w pilar cyst      Firm pink to brown papule c/w dermatofibroma      Pedunculated fleshy papule(s) c/w skin tag(s)      Evenly pigmented macule c/w junctional nevus     Mildly variegated pigmented, slightly irregular-bordered macule c/w mildly atypical nevus      Flesh colored to evenly pigmented papule c/w intradermal nevus       Pink pearly papule/plaque c/w basal cell carcinoma      Erythematous hyperkeratotic cursted plaque c/w SCC      Surgical scar with no sign of skin cancer recurrence      Open and closed comedones      Inflammatory papules and pustules      Verrucoid papule consistent consistent with wart     Erythematous eczematous patches and plaques     Dystrophic onycholytic nail with subungual debris c/w onychomycosis     Umbilicated papule    Erythematous-base heme-crusted tan verrucoid plaque consistent with inflamed seborrheic keratosis     Erythematous Silvery Scaling Plaque c/w Psoriasis     See annotation            Assessment / Plan:      Pathology Orders:       Normal Orders This Visit    Specimen to Pathology, Dermatology     Questions:    Procedure Type: Dermatology and skin neoplasms    Number of Specimens: 1    ------------------------: -------------------------    Spec 1 Procedure: Shave Biopsy    Spec 1 Clinical Impression: MIS vs other    Spec 1 Source: left abdomen    Clinical Information: see EPIC     Clinical History: see EPIC    Specimen Source: Skin    Release to patient: Immediate    Send normal result to authorizing provider's In Basket if patient is active on MyChart: Yes          Neoplasm of uncertain behavior of skin  -     Specimen to Pathology, Dermatology  Shave biopsy procedure note:    Shave biopsy performed after verbal consent including risk of infection, scar, recurrence, need for additional treatment of site. Area prepped with alcohol, anesthetized with approximately 1.0cc of 1% lidocaine with epinephrine. Lesional tissue shaved with razor blade. Hemostasis achieved with application of aluminum chloride followed by hyfrecation. No complications. Dressing applied. Wound care explained.    Skin cancer screening  -     Ambulatory referral/consult to Dermatology    Encounter for surveillance of abnormal nevi  -     Ambulatory referral/consult to Dermatology  Total body skin examination performed today including at least 12 points as noted in physical examination. 1 lesion suspicious for malignancy noted.    Multiple benign nevi  Careful dermoscopy evaluation of nevi performed with none identified as needing biopsy today  Monitor for new mole or moles that are becoming bigger, darker, irritated, or developing irregular borders.     Solar lentigo  This is a benign hyperpigmented sun induced lesion. Daily sun protection will reduce the number of new lesions. Treatment of these benign lesions are considered cosmetic.    Patient instructed in importance in daily broad spectrum sun protection of at least spf 30. Mineral sunscreen ingredients preferred (Zinc +/- Titanium) and can be found OTC.   Patient encouraged to wear hat for all outdoor exposure.   Also discussed sun avoidance and use of protective clothing.           Follow up in about 4 months (around 11/7/2025).

## 2025-07-07 NOTE — PATIENT INSTRUCTIONS
Shave Biopsy Wound Care    Your doctor has performed a shave biopsy today.  A band aid and vaseline ointment has been placed over the site.  This should remain in place for 24 hours.  It is recommended that you keep the area dry for the first 24 hours.  After 24 hours, you may remove the band aid and wash the area with warm soap and water and apply Vaseline jelly.  Many patients prefer to use Neosporin or Bacitracin ointment.  This is acceptable; however, know that you can develop an allergy to this medication even if you have used it safely for years.  It is important to keep the area moist.  Letting it dry out and get air slows healing time, and will worsen the scar.  Band aid is optional after first 24 hours.      If you notice increasing redness, tenderness, pain, or yellow drainage at the biopsy site, please notify your doctor.  These are signs of an infection.    If your biopsy site is bleeding, apply firm pressure for 15 minutes straight.  Repeat for another 15 minutes, if it is still bleeding.   If the surgical site continues to bleed, then please contact your doctor.       [facility] - DERMATOLOGY  [address], [address]  [place] [address]  Dept: [phone]  Dept Fax: [phone]

## 2025-07-09 LAB
ESTROGEN SERPL-MCNC: NORMAL PG/ML
INSULIN SERPL-ACNC: NORMAL U[IU]/ML
LAB AP CLINICAL INFORMATION: NORMAL
LAB AP GROSS DESCRIPTION: NORMAL
LAB AP REPORT FOOTNOTES: NORMAL
T3RU NFR SERPL: NORMAL %

## 2025-07-10 ENCOUNTER — RESULTS FOLLOW-UP (OUTPATIENT)
Dept: DERMATOLOGY | Facility: CLINIC | Age: 35
End: 2025-07-10
Payer: OTHER GOVERNMENT

## 2025-07-11 ENCOUNTER — TELEPHONE (OUTPATIENT)
Dept: OBSTETRICS AND GYNECOLOGY | Facility: CLINIC | Age: 35
End: 2025-07-11
Payer: OTHER GOVERNMENT

## 2025-07-11 NOTE — TELEPHONE ENCOUNTER
LVM to confirm appt with Dr. Otero on 7/14/25 @ 2:30 pm. Contact office if need to reschedule or cancel appt.

## 2025-07-14 ENCOUNTER — OFFICE VISIT (OUTPATIENT)
Dept: OBSTETRICS AND GYNECOLOGY | Facility: CLINIC | Age: 35
End: 2025-07-14
Payer: OTHER GOVERNMENT

## 2025-07-14 VITALS
SYSTOLIC BLOOD PRESSURE: 118 MMHG | DIASTOLIC BLOOD PRESSURE: 60 MMHG | HEIGHT: 66 IN | WEIGHT: 147.63 LBS | BODY MASS INDEX: 23.72 KG/M2

## 2025-07-14 DIAGNOSIS — Z01.419 WELL WOMAN EXAM: Primary | ICD-10-CM

## 2025-07-14 DIAGNOSIS — N76.0 VULVOVAGINITIS: ICD-10-CM

## 2025-07-14 PROCEDURE — 99213 OFFICE O/P EST LOW 20 MIN: CPT | Mod: PBBFAC,PO | Performed by: GENERAL PRACTICE

## 2025-07-14 PROCEDURE — 99395 PREV VISIT EST AGE 18-39: CPT | Mod: S$PBB,,, | Performed by: GENERAL PRACTICE

## 2025-07-14 PROCEDURE — 99999 PR PBB SHADOW E&M-EST. PATIENT-LVL III: CPT | Mod: PBBFAC,,, | Performed by: GENERAL PRACTICE

## 2025-07-14 PROCEDURE — 81515 NFCT DS BV&VAGINITIS DNA ALG: CPT | Performed by: GENERAL PRACTICE

## 2025-07-14 NOTE — PATIENT INSTRUCTIONS
STD AND VAGINITIS TESTING:  If you are enrolled in [employer], I will trust that when you see a negative result, you understand that means you do not have the particular infection or STD we were checking for.  You will not get a message from me.    If something comes back positive, one of my staff members or I will call you to let you know about the result and what the recommended treatment is.  For most STD's, it is VERY important that you do not have sex until both you and your partner(s) have completed treatment for the STD.  I cannot prescribe medications for your partner(s).    Bacterial Vaginitis (BV) and vaginal yeast infections (Candida, for example) are not STD's.    Trichomonas is an STD.    If you are prescribed an antibiotic, it is very important that you take all of the medicine as prescribed.  Incomplete treatment can cause a relapse and/or antibiotic resistance.

## 2025-07-14 NOTE — PROGRESS NOTES
Background   23:  Holly is a 33 y.o., using Copper IUD for contraception, here for evaluation of abnormal bleeding.  --> discussed was isolated episode and encouraged to RTC if things didn't straighten out    2024  Holly Langston is a 34 y.o. here for [employer].  Since seeing me last, her periods have gone back to normal.  Attributes the one abnormal cycle to the stress of moving.  Considering re-trying the Serenol for PMS symptoms (had stopped it due to isolated abnormal period).       ASSESSMENT AND PLAN   2025  35 y.o.  for WWE.  Normal exam - unclear whether discharge is from the medicine or from a yeast infection.    f/u results of vaginitis swab  Cervical Cancer screening: next cervical CA screen (PAP+HPV) due 2029  HPV Vaccine: declines  Mammogram: age 40  Encouraged self breast awareness; RTC for breast concerns  Return to clinic: for periodic GYN wellness exam, every 1-3 years per patient preference and comfort level    Leslie L Weeks, MD [facility]    SUBJECTIVE   2025  Holly Langston is a 35 y.o. here for WWE.    Traveling, had a possible yeast infection.  Did a Monistat and then Vagisil OTC.  Mostly better.  Last use of vaginal suppository was Friday.  Full medical history reviewed and updated today (below).     G'sP's:   Relationship:  AUG 2015, sexually active  Contraception: Copper IUD, placed MAY 2021   LMP: Patient's last menstrual period was 2025.  PAP Hx: no h/o abnormals  LAST PAP: 2024: PAP neg / HPV neg      OBJECTIVE   PHYSICAL EXAM  Vitals:    25 1434   BP: 118/60     GEN = alert/oriented, nad, pleasant  HEENT = sclera anicteric, EOM grossly normal  CV = BP and HR as per vitals  PULM = normal respiratory effort   =      External: NEFG, both skin lesions stable since last year     Vagina: no lesions, normal, urethral meatus normal     Discharge: moderate, curd-like, odorless, white     Cervix: normal-appearing, IUD  string visualized     Bimanual: uterus normal size and nontender, no adnexal masses or tenderness    HISTORY   Date taken or verified: 2025     GYNECOLOGIC HISTORY  PAP Hx: no h/o abnormals  Genital HSV: No  Other STD Hx: denies    Menarche: 13yo  Bleeding -- #Days Bleedin / # Heavy Days: 2 / Product Change on heavy days: 4-5x / Intermenstrual Bleeding: No / Cycle: regular every 28-30 days  Pain -- Dysmenorrhea: denies / Non-menstrual pelvic pain: No / Dyspareunia: denies    OBSTETRIC HISTORY  Living children: 2  Vaginal deliveries: 2  Ages 7 and 4    SOCIAL HISTORY  Lives with:  and daughters  Smoker: non-smoker / Alcohol: yes, socially / Drugs: denies  Domestic Violence: No  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[employer], [employer]    FAMILY HISTORY  BLEEDING or CLOTTING DISORDERS: mother has DVT - has been for a few years, on blood thinners  BREAST CA: none / UTERINE CA: none / OVARIAN CA: none  COLON CA: none     PAST MEDICAL HISTORY  -------------------------------------    Scoliosis     PAST SURGICAL HISTORY  ----------------------------    Fracture surgery    nose     ALLERGIES  Review of patient's allergies indicates:   Allergen Reactions    Sulfa (sulfonamide antibiotics) Swelling     MEDICATIONS  Current Outpatient Medications   Medication Instructions    azelastine (ASTELIN) 137 mcg, Nasal, 2 times daily    copper (PARAGARD T 380A) 380 mm2 IUD Take by intrauterine route.    fluticasone propionate (FLONASE) 50 mcg, Each Nostril, 2 times daily    Lactobacill 46-B.animal-inulin (PROBIOTIC-10, WITH INULIN,) 10 billion cell -100 mg Cap     multivitamin (ONE DAILY MULTIVITAMIN) per tablet 1 tablet, Daily    vitamin D (VITAMIN D3) 1,000 Units, Daily

## 2025-07-16 ENCOUNTER — TELEPHONE (OUTPATIENT)
Dept: DERMATOLOGY | Facility: CLINIC | Age: 35
End: 2025-07-16
Payer: OTHER GOVERNMENT

## 2025-07-16 NOTE — TELEPHONE ENCOUNTER
Copied from CRM #[id number]. Topic: General Inquiry - Return Call  >> Jul 16, 2025  9:58 AM Miguel wrote:  Type:  Patient Returning Call    Who Called:Pt  Who Left Message for Patient:Nurse  Does the patient know what this is regarding?:Biopsy  Would the patient rather a call back or a response via [facility]? Call  Best Call Back Number:[phone]   Additional Information:

## 2025-07-19 LAB
BACTERIAL VAGINOSIS DNA (OHS): NOT DETECTED
CANDIDA GLABRATA/KRUSEI DNA (OHS): NOT DETECTED
CANDIDA SPECIES DNA (OHS): NOT DETECTED
TRICHOMONAS VAGINALIS DNA (OHS): NOT DETECTED